# Patient Record
Sex: MALE | Race: OTHER | URBAN - METROPOLITAN AREA
[De-identification: names, ages, dates, MRNs, and addresses within clinical notes are randomized per-mention and may not be internally consistent; named-entity substitution may affect disease eponyms.]

---

## 2017-01-26 VITALS
HEART RATE: 60 BPM | TEMPERATURE: 97 F | SYSTOLIC BLOOD PRESSURE: 144 MMHG | DIASTOLIC BLOOD PRESSURE: 70 MMHG | OXYGEN SATURATION: 98 % | RESPIRATION RATE: 14 BRPM

## 2017-01-26 RX ORDER — CHLORHEXIDINE GLUCONATE 213 G/1000ML
1 SOLUTION TOPICAL ONCE
Qty: 0 | Refills: 0 | Status: DISCONTINUED | OUTPATIENT
Start: 2017-01-27 | End: 2017-01-28

## 2017-01-26 NOTE — H&P ADULT. - HISTORY OF PRESENT ILLNESS
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 Of note pt had 14 second run of NSVT on last admission and had EP consult, Holter monitor ordered, and EP follow up appointment.   Of note pt was reluctant to listen to the discharge and follow up instructions and was given paper prescriptions from the last admission 2/2 to reluctance to give an appropriate pharmacy.   Cardiac Cath @ [facility] on 2016: YAO to the prox/mid RCA, patent SVG OM1, occluded SVG OM@ and SVG RCA, EF 45%. Patient will need to return for staged PCI of LAD in 5 weeks (scheduled for 8/12/16). 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 Of note pt had 14 second run of NSVT on last admission and had EP consult, Holter monitor ordered, and EP follow up appointment.   Of note pt was reluctant to listen to the discharge and follow up instructions and was given paper prescriptions from the last admission 2/2 to reluctance to give an appropriate pharmacy.   Cardiac Cath @ [facility] on 2016: YAO x 3 to the prox/mid/distal RCA, patent KEVIN-LAD, prox SVG-OM1 50%, occluded  SVG OM2 and SVG RPDA. EF 45%. dLM 30%. mLAD 705. dLAD 90%. mCx 100% . Patient will need to return for staged PCI of LAD in 5 weeks (scheduled for 8/12/16). 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 Of note pt had 14 second run of NSVT on 7/2016 admission and had EP consult, Holter monitor ordered, and EP follow up appointment. Unclear if pt followed up. Pt has been endorsing consistent chest pain at rest since the last cardiac catheterization, no relieved with Tylenol. Pt was told to go to the nearest ED if he is having pain. Pt states he's been to his PMD multiple times endorsing the chest pain and his MD gives him medications (but not Nitro SL) that have not helped with the pain. Pt was told by JO ANN Hammond to go to the nearest ED if he is having chest pain at rest that does not resolve.  Message left to inform Dr. Grimm.   Of note pt was reluctant to listen to the discharge and follow up instructions and was given paper prescriptions from the last admission 2/2 to reluctance to give an appropriate pharmacy.   Cardiac Cath @ [facility] on 2016: YAO x 3 to the prox/mid/distal RCA, patent KEVIN-LAD, prox SVG-OM1 50%, occluded  SVG OM2 and SVG RPDA. EF 45%. dLM 30%. mLAD 705. dLAD 90%. mCx 100% . Patient will need to return for staged PCI of LAD in 5 weeks (scheduled for 8/12/16). 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 Of note pt had 14 second run of NSVT on 7/2016 admission and had EP consult, Holter monitor ordered, and EP follow up appointment. Unclear if pt followed up. Pt has been endorsing consistent chest pain at rest since the last cardiac catheterization, no relieved with Tylenol. Pt was told to go to the nearest ED if he is having pain. Pt states he's been to his PMD multiple times endorsing the chest pain and his MD gives him medications (but not Nitro SL) that have not helped with the pain. Pt was told by JO ANN Hammond to go to the nearest ED if he is having chest pain at rest that does not resolve.  Message left to inform Dr. Grimm.   Of note pt was reluctant to listen to the discharge and follow up instructions and was given paper prescriptions from the last admission 2/2 to reluctance to give an appropriate pharmacy.   Cardiac Cath @ [facility] on 2016: YAO x 3 to the prox/mid/distal RCA, patent LIMA-D1, prox SVG-OM1 50%, occluded  SVG OM2 and SVG RPDA. EF 45%. dLM 30%. mLAD 705. dLAD 90%. mCx 100% . Patient will need to return for staged PCI of LAD in 5 weeks (scheduled for 8/12/16). 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 Of note pt had 14 second run of NSVT on 7/2016 admission and had EP consult, Holter monitor ordered, and EP follow up appointment. Unclear if pt followed up. Pt has been endorsing consistent chest pain at rest since the last cardiac catheterization, no relieved with Tylenol. Pt was told to go to the nearest ED if he is having pain. Pt states he's been to his PMD multiple times endorsing the chest pain and his MD gives him medications (but not Nitro SL) that have not helped with the pain. Pt was told by JO ANN Hammond to go to the nearest ED if he is having chest pain at rest that does not resolve.  Message left to inform Dr. Grimm.   Of note pt was reluctant to listen to the discharge and follow up instructions and was given paper prescriptions from the last admission 2/2 to reluctance to give an appropriate pharmacy.   Cardiac Cath @ [facility] on 7/11/2016: YAO x 3 to the prox/mid/distal RCA, patent LIMA-D1, prox SVG-OM1 50%, occluded  SVG OM2 and SVG RPDA. EF 45%. dLM 30%. mLAD 705. dLAD 90%. mCx 100% . Patient will need to return for staged PCI of LAD in 5 weeks (scheduled for 8/12/16). 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 Of note pt had 14 second run of NSVT on 7/2016 admission and had EP consult, Holter monitor ordered, and EP follow up appointment. Unclear if pt followed up. Pt has been endorsing consistent chest pain at rest since the last cardiac catheterization, no relieved with Tylenol. Pt was told to go to the nearest ED if he is having pain. Pt states he's been to his PMD multiple times endorsing the chest pain and his MD gives him medications (but not Nitro SL) that have not helped with the pain. Pt was told by JO ANN Hammond to go to the nearest ED if he is having chest pain at rest that does not resolve.  Message left to inform Dr. Grimm.   Of note pt was reluctant to listen to the discharge and follow up instructions and was given paper prescriptions from the last admission 2/2 to reluctance to give an appropriate pharmacy.   Cardiac Cath @ [facility] on 7/11/2016: YAO x 3 to the prox/mid/distal RCA, patent LIMA-D1, prox SVG-OM1 50%, occluded  SVG OM2 and SVG RPDA. EF 45%. dLM 30%. mLAD 705. dLAD 90%. mCx 100% . Patient will need to return for staged PCI of LAD in 5 weeks (scheduled for 8/12/16).

## 2017-01-26 NOTE — H&P ADULT. - PMH
Hypercholesterolemia    Hypertension    Pneumonia CAD (coronary artery disease)    Hypercholesterolemia    Hypertension    Pneumonia

## 2017-01-26 NOTE — H&P ADULT. - ASSESSMENT
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     ASA III           Mallampati III  Risks & benefits of procedure and alternative therapy have been explained to the patient including but not limited to: allergic reaction, bleeding w/possible need for blood transfusion, infection, renal and vascular compromise, limb damage, arrhythmia, stroke, vessel dissection/perforation, Myocardial infarction, emergent CABG. Informed consent obtained and in chart.     Upon arrival to the cath lab patient reported chest pain. Upon arrival of PA patient reports chest pain has improved since arrival now 3/10. Patient reports associated SOB and palpitations but denies dizziness, diaphoresis, N/V, syncope. VSS on arrival EKG performed revealing no acute ST changes however TWI III, V4-V6. Patient appears comfortable.    On prior admission 7/2016 patient was reluctant to provide an appropriate pharmacy and was reluctant to listen to discharge and follow-up instructions and given paper prescriptions. Both [facility] and [facility] contacted and last prescription filled for Plavix was 9/2016. Patient brought medication bottles and Plavix not with medication bottles. CBC stable. ASA  mg PO x 1 and Plavix 600 mg PO x 1 ordered. 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  In light of patient's risk factors, known CAD, CCS Angina Class IV Symptoms patient now presents for recommended cardiac cath with probable PCI of LAD and to rule out suspected ISR of RCA.     ASA III           Mallampati III  Risks & benefits of procedure and alternative therapy have been explained to the patient including but not limited to: allergic reaction, bleeding w/possible need for blood transfusion, infection, renal and vascular compromise, limb damage, arrhythmia, stroke, vessel dissection/perforation, Myocardial infarction, emergent CABG. Informed consent obtained and in chart.     Upon arrival to the cath lab patient reported chest pain. Upon arrival of PA patient reports chest pain has improved since arrival now 3/10. Patient reports associated SOB and palpitations but denies dizziness, diaphoresis, N/V, syncope. VSS on arrival EKG performed revealing no acute ST changes however TWI III, V4-V6. Patient appears comfortable. PA unable to obtain prior EKG for comparison due to [place] Error    On prior admission 7/2016 patient was reluctant to provide an appropriate pharmacy and was reluctant to listen to discharge and follow-up instructions and given paper prescriptions. Both [facility] and [facility] contacted and last prescription filled for Plavix was 9/2016. Patient brought medication bottles and Plavix not with medication bottles. CBC stable. ASA  mg PO x 1 and Plavix 600 mg PO x 1 ordered and given prior to procedure. Prescription for Imdur ER 30 mg PO daily filled 1/16/17 however not included with medication bottles brought in by patient and patient is unsure if he is taking. Patient prefers prescriptions to be sent to [facility]. 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  In light of patient's risk factors, known CAD, CCS Angina Class IV Symptoms patient now presents for recommended cardiac cath with probable PCI of LAD and to rule out suspected ISR of RCA.     ASA III           Mallampati III  Risks & benefits of procedure and alternative therapy have been explained to the patient including but not limited to: allergic reaction, bleeding w/possible need for blood transfusion, infection, renal and vascular compromise, limb damage, arrhythmia, stroke, vessel dissection/perforation, Myocardial infarction, emergent CABG. Informed consent obtained and in chart.     Upon arrival to the cath lab patient reported chest pain. Upon arrival of PA patient reports chest pain has improved since arrival now 3/10. Patient reports associated SOB and palpitations but denies dizziness, diaphoresis, N/V, syncope. VSS on arrival EKG performed revealing no acute ST changes however TWI III, V4-V6. Patient appears comfortable. PA unable to obtain prior EKG for comparison due to [place] Error    On prior admission 7/2016 patient was reluctant to provide an appropriate pharmacy and was reluctant to listen to discharge and follow-up instructions and given paper prescriptions. Both [facility] and [facility] contacted and last prescription filled for Plavix was 9/2016. Patient brought medication bottles and Plavix not with medication bottles. CBC stable. ASA  mg PO x 1 and Plavix 600 mg PO x 1 ordered and given prior to procedure. Prescription for Imdur ER 30 mg PO daily filled 1/16/17 however not included with medication bottles brought in by patient and patient is unsure if he is taking. Patient prefers prescriptions to be sent to [facility]. Prescription for Plavix #30 with 11 refills E-Prescribed to [facility] by JO ANN Morales and patient instructed to .

## 2017-01-27 ENCOUNTER — INPATIENT (INPATIENT)
Facility: HOSPITAL | Age: 72
LOS: 0 days | Discharge: HOME | DRG: 246 | End: 2017-01-28
Attending: INTERNAL MEDICINE | Admitting: INTERNAL MEDICINE
Payer: MEDICARE

## 2017-01-27 DIAGNOSIS — Z95.1 PRESENCE OF AORTOCORONARY BYPASS GRAFT: Chronic | ICD-10-CM

## 2017-01-27 LAB
ALBUMIN SERPL ELPH-MCNC: 3.8 G/DL — SIGNIFICANT CHANGE UP (ref 3.4–5)
ALP SERPL-CCNC: 95 U/L — SIGNIFICANT CHANGE UP (ref 40–120)
ALT FLD-CCNC: 41 U/L — SIGNIFICANT CHANGE UP (ref 12–42)
ANION GAP SERPL CALC-SCNC: 7 MMOL/L — LOW (ref 9–16)
APTT BLD: 31.5 SEC — SIGNIFICANT CHANGE UP (ref 27.5–37.4)
AST SERPL-CCNC: 28 U/L — SIGNIFICANT CHANGE UP (ref 15–37)
BASOPHILS NFR BLD AUTO: 0.2 % — SIGNIFICANT CHANGE UP (ref 0–2)
BILIRUB SERPL-MCNC: 0.4 MG/DL — SIGNIFICANT CHANGE UP (ref 0.2–1.2)
BUN SERPL-MCNC: 20 MG/DL — SIGNIFICANT CHANGE UP (ref 7–23)
CALCIUM SERPL-MCNC: 8.5 MG/DL — SIGNIFICANT CHANGE UP (ref 8.5–10.5)
CHLORIDE SERPL-SCNC: 110 MMOL/L — HIGH (ref 96–108)
CHOLEST SERPL-MCNC: 126 MG/DL — SIGNIFICANT CHANGE UP
CK MB CFR SERPL CALC: 2.4 NG/ML — SIGNIFICANT CHANGE UP (ref 0.5–3.6)
CO2 SERPL-SCNC: 27 MMOL/L — SIGNIFICANT CHANGE UP (ref 22–31)
CREAT SERPL-MCNC: 0.84 MG/DL — SIGNIFICANT CHANGE UP (ref 0.5–1.3)
EOSINOPHIL NFR BLD AUTO: 2.3 % — SIGNIFICANT CHANGE UP (ref 0–6)
GLUCOSE SERPL-MCNC: 91 MG/DL — SIGNIFICANT CHANGE UP (ref 70–99)
HBA1C BLD-MCNC: 6 % — HIGH (ref 4.8–5.6)
HCT VFR BLD CALC: 40.6 % — SIGNIFICANT CHANGE UP (ref 39–50)
HDLC SERPL-MCNC: 43 MG/DL — SIGNIFICANT CHANGE UP
HGB BLD-MCNC: 13.8 G/DL — SIGNIFICANT CHANGE UP (ref 13–17)
INR BLD: 1.14 — SIGNIFICANT CHANGE UP (ref 0.88–1.16)
LIPID PNL WITH DIRECT LDL SERPL: 62 MG/DL — SIGNIFICANT CHANGE UP
LYMPHOCYTES # BLD AUTO: 27.5 % — SIGNIFICANT CHANGE UP (ref 13–44)
MCHC RBC-ENTMCNC: 30.7 PG — SIGNIFICANT CHANGE UP (ref 27–34)
MCHC RBC-ENTMCNC: 34 G/DL — SIGNIFICANT CHANGE UP (ref 32–36)
MCV RBC AUTO: 90.4 FL — SIGNIFICANT CHANGE UP (ref 80–100)
MONOCYTES NFR BLD AUTO: 7.2 % — SIGNIFICANT CHANGE UP (ref 2–14)
NEUTROPHILS NFR BLD AUTO: 62.8 % — SIGNIFICANT CHANGE UP (ref 43–77)
PLATELET # BLD AUTO: 145 K/UL — LOW (ref 150–400)
POTASSIUM SERPL-MCNC: 4 MMOL/L — SIGNIFICANT CHANGE UP (ref 3.5–5.3)
POTASSIUM SERPL-SCNC: 4 MMOL/L — SIGNIFICANT CHANGE UP (ref 3.5–5.3)
PROT SERPL-MCNC: 7.7 G/DL — SIGNIFICANT CHANGE UP (ref 6.4–8.2)
PROTHROM AB SERPL-ACNC: 12.7 SEC — SIGNIFICANT CHANGE UP (ref 10–13.1)
RBC # BLD: 4.49 M/UL — SIGNIFICANT CHANGE UP (ref 4.2–5.8)
RBC # FLD: 12.9 % — SIGNIFICANT CHANGE UP (ref 10.3–16.9)
SODIUM SERPL-SCNC: 144 MMOL/L — SIGNIFICANT CHANGE UP (ref 135–145)
TOTAL CHOLESTEROL/HDL RATIO MEASUREMENT: 2.9 RATIO — SIGNIFICANT CHANGE UP
TRIGL SERPL-MCNC: 104 MG/DL — SIGNIFICANT CHANGE UP
WBC # BLD: 6.1 K/UL — SIGNIFICANT CHANGE UP (ref 3.8–10.5)
WBC # FLD AUTO: 6.1 K/UL — SIGNIFICANT CHANGE UP (ref 3.8–10.5)

## 2017-01-27 PROCEDURE — 93455 CORONARY ART/GRFT ANGIO S&I: CPT | Mod: 26,XU

## 2017-01-27 PROCEDURE — 92933 PRQ TRLML C ATHRC ST ANGIOP1: CPT | Mod: LD

## 2017-01-27 PROCEDURE — 93010 ELECTROCARDIOGRAM REPORT: CPT | Mod: 59

## 2017-01-27 PROCEDURE — 92937 PRQ TRLUML REVSC CAB GRF 1: CPT | Mod: LC

## 2017-01-27 RX ORDER — LEVOTHYROXINE SODIUM 125 MCG
50 TABLET ORAL DAILY
Qty: 0 | Refills: 0 | Status: DISCONTINUED | OUTPATIENT
Start: 2017-01-27 | End: 2017-01-28

## 2017-01-27 RX ORDER — METOPROLOL TARTRATE 50 MG
1 TABLET ORAL
Qty: 30 | Refills: 3 | COMMUNITY

## 2017-01-27 RX ORDER — ASPIRIN/CALCIUM CARB/MAGNESIUM 324 MG
81 TABLET ORAL DAILY
Qty: 0 | Refills: 0 | Status: DISCONTINUED | OUTPATIENT
Start: 2017-01-27 | End: 2017-01-28

## 2017-01-27 RX ORDER — CLOPIDOGREL BISULFATE 75 MG/1
600 TABLET, FILM COATED ORAL ONCE
Qty: 0 | Refills: 0 | Status: COMPLETED | OUTPATIENT
Start: 2017-01-27 | End: 2017-01-27

## 2017-01-27 RX ORDER — TAMSULOSIN HYDROCHLORIDE 0.4 MG/1
0.4 CAPSULE ORAL AT BEDTIME
Qty: 0 | Refills: 0 | Status: DISCONTINUED | OUTPATIENT
Start: 2017-01-27 | End: 2017-01-28

## 2017-01-27 RX ORDER — SODIUM CHLORIDE 9 MG/ML
1000 INJECTION INTRAMUSCULAR; INTRAVENOUS; SUBCUTANEOUS
Qty: 0 | Refills: 0 | Status: DISCONTINUED | OUTPATIENT
Start: 2017-01-27 | End: 2017-01-28

## 2017-01-27 RX ORDER — TAMSULOSIN HYDROCHLORIDE 0.4 MG/1
1 CAPSULE ORAL
Qty: 0 | Refills: 0 | COMMUNITY

## 2017-01-27 RX ORDER — CLOPIDOGREL BISULFATE 75 MG/1
1 TABLET, FILM COATED ORAL
Qty: 30 | Refills: 11 | OUTPATIENT
Start: 2017-01-27 | End: 2018-01-21

## 2017-01-27 RX ORDER — ISOSORBIDE MONONITRATE 60 MG/1
1 TABLET, EXTENDED RELEASE ORAL
Qty: 0 | Refills: 0 | COMMUNITY

## 2017-01-27 RX ORDER — SODIUM CHLORIDE 9 MG/ML
500 INJECTION INTRAMUSCULAR; INTRAVENOUS; SUBCUTANEOUS
Qty: 0 | Refills: 0 | Status: DISCONTINUED | OUTPATIENT
Start: 2017-01-27 | End: 2017-01-27

## 2017-01-27 RX ORDER — METOPROLOL TARTRATE 50 MG
25 TABLET ORAL DAILY
Qty: 0 | Refills: 0 | Status: DISCONTINUED | OUTPATIENT
Start: 2017-01-27 | End: 2017-01-28

## 2017-01-27 RX ORDER — ATORVASTATIN CALCIUM 80 MG/1
40 TABLET, FILM COATED ORAL AT BEDTIME
Qty: 0 | Refills: 0 | Status: DISCONTINUED | OUTPATIENT
Start: 2017-01-27 | End: 2017-01-28

## 2017-01-27 RX ORDER — ASPIRIN/CALCIUM CARB/MAGNESIUM 324 MG
325 TABLET ORAL ONCE
Qty: 0 | Refills: 0 | Status: COMPLETED | OUTPATIENT
Start: 2017-01-27 | End: 2017-01-27

## 2017-01-27 RX ORDER — CLOPIDOGREL BISULFATE 75 MG/1
75 TABLET, FILM COATED ORAL DAILY
Qty: 0 | Refills: 0 | Status: DISCONTINUED | OUTPATIENT
Start: 2017-01-27 | End: 2017-01-28

## 2017-01-27 RX ORDER — LOSARTAN POTASSIUM 100 MG/1
25 TABLET, FILM COATED ORAL DAILY
Qty: 0 | Refills: 0 | Status: DISCONTINUED | OUTPATIENT
Start: 2017-01-27 | End: 2017-01-28

## 2017-01-27 RX ADMIN — SODIUM CHLORIDE 75 MILLILITER(S): 9 INJECTION INTRAMUSCULAR; INTRAVENOUS; SUBCUTANEOUS at 11:42

## 2017-01-27 RX ADMIN — SODIUM CHLORIDE 100 MILLILITER(S): 9 INJECTION INTRAMUSCULAR; INTRAVENOUS; SUBCUTANEOUS at 17:50

## 2017-01-27 RX ADMIN — CLOPIDOGREL BISULFATE 600 MILLIGRAM(S): 75 TABLET, FILM COATED ORAL at 11:41

## 2017-01-27 RX ADMIN — TAMSULOSIN HYDROCHLORIDE 0.4 MILLIGRAM(S): 0.4 CAPSULE ORAL at 22:22

## 2017-01-27 RX ADMIN — Medication 325 MILLIGRAM(S): at 11:41

## 2017-01-27 RX ADMIN — ATORVASTATIN CALCIUM 40 MILLIGRAM(S): 80 TABLET, FILM COATED ORAL at 22:22

## 2017-01-28 ENCOUNTER — TRANSCRIPTION ENCOUNTER (OUTPATIENT)
Age: 72
End: 2017-01-28

## 2017-01-28 VITALS — TEMPERATURE: 98 F

## 2017-01-28 LAB
ANION GAP SERPL CALC-SCNC: 6 MMOL/L — LOW (ref 9–16)
BUN SERPL-MCNC: 18 MG/DL — SIGNIFICANT CHANGE UP (ref 7–23)
CALCIUM SERPL-MCNC: 8.6 MG/DL — SIGNIFICANT CHANGE UP (ref 8.5–10.5)
CHLORIDE SERPL-SCNC: 108 MMOL/L — SIGNIFICANT CHANGE UP (ref 96–108)
CO2 SERPL-SCNC: 27 MMOL/L — SIGNIFICANT CHANGE UP (ref 22–31)
CREAT SERPL-MCNC: 0.8 MG/DL — SIGNIFICANT CHANGE UP (ref 0.5–1.3)
GLUCOSE SERPL-MCNC: 94 MG/DL — SIGNIFICANT CHANGE UP (ref 70–99)
HCT VFR BLD CALC: 39.5 % — SIGNIFICANT CHANGE UP (ref 39–50)
HGB BLD-MCNC: 13.2 G/DL — SIGNIFICANT CHANGE UP (ref 13–17)
MAGNESIUM SERPL-MCNC: 2 MG/DL — SIGNIFICANT CHANGE UP (ref 1.6–2.4)
MCHC RBC-ENTMCNC: 29.6 PG — SIGNIFICANT CHANGE UP (ref 27–34)
MCHC RBC-ENTMCNC: 33.4 G/DL — SIGNIFICANT CHANGE UP (ref 32–36)
MCV RBC AUTO: 88.6 FL — SIGNIFICANT CHANGE UP (ref 80–100)
PLATELET # BLD AUTO: 133 K/UL — LOW (ref 150–400)
POTASSIUM SERPL-MCNC: 4.1 MMOL/L — SIGNIFICANT CHANGE UP (ref 3.5–5.3)
POTASSIUM SERPL-SCNC: 4.1 MMOL/L — SIGNIFICANT CHANGE UP (ref 3.5–5.3)
RBC # BLD: 4.46 M/UL — SIGNIFICANT CHANGE UP (ref 4.2–5.8)
RBC # FLD: 12.4 % — SIGNIFICANT CHANGE UP (ref 10.3–16.9)
SODIUM SERPL-SCNC: 141 MMOL/L — SIGNIFICANT CHANGE UP (ref 135–145)
WBC # BLD: 6.5 K/UL — SIGNIFICANT CHANGE UP (ref 3.8–10.5)
WBC # FLD AUTO: 6.5 K/UL — SIGNIFICANT CHANGE UP (ref 3.8–10.5)

## 2017-01-28 RX ADMIN — Medication 50 MICROGRAM(S): at 06:23

## 2017-01-28 RX ADMIN — LOSARTAN POTASSIUM 25 MILLIGRAM(S): 100 TABLET, FILM COATED ORAL at 06:23

## 2017-01-28 RX ADMIN — Medication 25 MILLIGRAM(S): at 06:23

## 2017-01-28 NOTE — DISCHARGE NOTE ADULT - HOSPITAL COURSE
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  In light of patient's risk factors, known CAD, CCS Angina Class IV Symptoms patient now presents for recommended cardiac cath with probable PCI of LAD and to rule out suspected ISR of RCA.Upon arrival to the cath lab patient reported chest pain. Upon arrival of PA patient reports chest pain has improved since arrival now 3/10. Patient reports associated SOB and palpitations but denies dizziness, diaphoresis, N/V, syncope. VSS on arrival EKG performed revealing no acute ST changes however TWI III, V4-V6. Patient appears comfortable. PA unable to obtain prior EKG for comparison due to [place] Error. On prior admission 7/2016 patient was reluctant to provide an appropriate pharmacy and was reluctant to listen to discharge and follow-up instructions and given paper prescriptions. Both [facility] and [facility] contacted and last prescription filled for Plavix was 9/2016. Patient brought medication bottles and Plavix not with medication bottles. CBC stable. ASA  mg PO x 1 and Plavix 600 mg PO x 1 ordered and given prior to procedure. Prescription for Imdur ER 30 mg PO daily filled 1/16/17 however not included with medication bottles brought in by patient and patient is unsure if he is taking. Patient prefers prescriptions to be sent to [facility]. Prescription for Plavix #30 with 11 refills E-Prescribed to [facility] by JO ANN Morales and patient instructed to .  Pt underwent Cardiac Cath on 1/27/17 revealing 2VD with patent RCA stent, YAO x 2 to SVG/OM2 graft, PTCA with CSI Atherectomy and YAO x 2 pLAD, PTCA/YAO x 1 to mLAD, PTCA only to dLAD. 250cc dye used. Hydration increased to 100cc/hr no EF done. Plavix was sent to patient's pharmacy day of cath.  Pt has all other medication and was instructed at length importance of medication compliance by PA an nurse at bedside.  EKG and labs reviewed. R groin ecchymotic, but soft non-tender no bleeding and reports no complaints o/n.  Pt stable for d/c as d/w Dr. Briones with f/u in Dr. Delgado's office in 1-2 weeks. D/C ppw signed and in chart.

## 2017-01-28 NOTE — DISCHARGE NOTE ADULT - PATIENT PORTAL LINK FT
“You can access the FollowHealth Patient Portal, offered by [facility], by registering with the following website: [email or web address]”

## 2017-01-28 NOTE — DISCHARGE NOTE ADULT - CARE PLAN
Principal Discharge DX:	CAD (coronary artery disease)  Goal:	You had blockages that you were supposed to come back for last year and instead came in yesterday and you got a total of 5 new stents.  YOU NEED TO TAKE ASPIRIN 81MG AND PLAVIX 75MG DAILY AND DO NOT SKIP DOSES OR STOP THESE MEDICATIONS UNLESS INSTRUCTED BY YOUR CARDIOLOGIST DR. TALAMANTES IN 1-2 WEEKS.  Instructions for follow-up, activity and diet:	You underwent a coronary angiogram and should wait 3 days before returning to ordinary activities. Do not drive for 2 days. Consult your doctor before returning to vigorous activity. You may return to work in 3-5 days. The catheter from your groin was removed and dressing was removed. You may see some bruising but it should remain soft and not hard or bleeding. You may shower once the dressing is removed, but avoid baths, hot tubs, or swimming for 5 days to prevent infection. If you notice bleeding from the site, hardening and pain at the site, drainage or redness from the site, coolness/paleness of the extremity, swelling, or fever, please call [phone]. Please continue your aspirin and plavix as prescribed unless otherwise indicated by your cardiologist. All of your prescriptions have been sent to the pharmacy. Please follow up with  in 1-2 weeks. All of your needed prescriptions have been sent electronically to your pharmacy.  Secondary Diagnosis:	Hypertension  Instructions for follow-up, activity and diet:	You have a history of elevated blood pressure and you should continue your blood pressure medications of Losartan 25mg daily, Metoprolol Succinate ER 25mg daily.  Secondary Diagnosis:	Hypercholesterolemia  Instructions for follow-up, activity and diet:	Please continue your daily Atorvastatin 40mg at bedtime to ensure your cardiac stent remains open.  Secondary Diagnosis:	BPH (benign prostatic hypertrophy)  Instructions for follow-up, activity and diet:	Continue taking your Flomax 0.4mg at bedtime  Secondary Diagnosis:	Hypothyroidism  Instructions for follow-up, activity and diet:	Continue taking your Synthroid 50mcg daily. Principal Discharge DX:	CAD (coronary artery disease)  Goal:	You had blockages that you were supposed to come back for last year and instead came in yesterday and you got a total of 5 new stents.  YOU NEED TO TAKE ASPIRIN 81MG AND PLAVIX 75MG DAILY AND DO NOT SKIP DOSES OR STOP THESE MEDICATIONS UNLESS INSTRUCTED BY YOUR CARDIOLOGIST DR. TALAMANTES IN 1-2 WEEKS.  Instructions for follow-up, activity and diet:	You underwent a coronary angiogram and should wait 3 days before returning to ordinary activities. Do not drive for 2 days. Consult your doctor before returning to vigorous activity. You may return to work in 3-5 days. The catheter from your groin was removed and dressing was removed. You may see some bruising but it should remain soft and not hard or bleeding. You may shower once the dressing is removed, but avoid baths, hot tubs, or swimming for 5 days to prevent infection. If you notice bleeding from the site, hardening and pain at the site, drainage or redness from the site, coolness/paleness of the extremity, swelling, or fever, please call [phone]. Please continue your aspirin and plavix as prescribed unless otherwise indicated by your cardiologist. All of your prescriptions have been sent to the pharmacy. Please follow up with  in 1-2 weeks. All of your needed prescriptions have been sent electronically to your pharmacy.  Secondary Diagnosis:	Hypertension  Instructions for follow-up, activity and diet:	You have a history of elevated blood pressure and you should continue your blood pressure medications of Losartan 25mg daily, Metoprolol Succinate ER 25mg daily.  Secondary Diagnosis:	Hypercholesterolemia  Instructions for follow-up, activity and diet:	Please continue your daily Atorvastatin 40mg at bedtime to ensure your cardiac stent remains open.  Secondary Diagnosis:	BPH (benign prostatic hypertrophy)  Instructions for follow-up, activity and diet:	Continue taking your Flomax 0.4mg at bedtime  Secondary Diagnosis:	Hypothyroidism  Instructions for follow-up, activity and diet:	Continue taking your Synthroid 50mcg daily.

## 2017-01-28 NOTE — DISCHARGE NOTE ADULT - CARE PROVIDER_API CALL
Markell Delgado), Cardiovascular Disease  [address] [place] [address]  Phone: [phone]  Fax: [phone]

## 2017-01-28 NOTE — DISCHARGE NOTE ADULT - MEDICATION SUMMARY - MEDICATIONS TO TAKE
I will START or STAY ON the medications listed below when I get home from the hospital:    Aspirin Enteric Coated 81 mg oral delayed release tablet  -- 1 tab(s) by mouth once a day  -- Indication: For CAD (coronary artery disease)    losartan 25 mg oral tablet  -- 1 tab(s) by mouth once a day  -- Indication: For Hypertension    Flomax 0.4 mg oral capsule  -- 1 cap(s) by mouth once a day  -- Indication: For BPH    Lipitor 40 mg oral tablet  -- 1 tab(s) by mouth once a day (at bedtime)  -- Indication: For Hyperlipidemia    clopidogrel 75 mg oral tablet  -- 1 tab(s) by mouth once a day  -- Do not take aspirin or aspirin containing products without knowledge and consent of your physician.    -- Indication: For CAD (coronary artery disease)    metoprolol succinate 25 mg oral tablet, extended release  -- 1 tab(s) by mouth once a day  -- Indication: For Hypertension    levothyroxine 50 mcg (0.05 mg) oral capsule  -- 1 cap(s) by mouth once a day  -- Indication: For Hypothyroidism

## 2017-01-28 NOTE — DISCHARGE NOTE ADULT - CARE PROVIDERS DIRECT ADDRESSES
,DirectAddress_Unknown,[email or web address]

## 2017-01-28 NOTE — DISCHARGE NOTE ADULT - PLAN OF CARE
You had blockages that you were supposed to come back for last year and instead came in yesterday and you got a total of 5 new stents.  YOU NEED TO TAKE ASPIRIN 81MG AND PLAVIX 75MG DAILY AND DO NOT SKIP DOSES OR STOP THESE MEDICATIONS UNLESS INSTRUCTED BY YOUR CARDIOLOGIST DR. TALAMANTES IN 1-2 WEEKS. You underwent a coronary angiogram and should wait 3 days before returning to ordinary activities. Do not drive for 2 days. Consult your doctor before returning to vigorous activity. You may return to work in 3-5 days. The catheter from your groin was removed and dressing was removed. You may see some bruising but it should remain soft and not hard or bleeding. You may shower once the dressing is removed, but avoid baths, hot tubs, or swimming for 5 days to prevent infection. If you notice bleeding from the site, hardening and pain at the site, drainage or redness from the site, coolness/paleness of the extremity, swelling, or fever, please call [phone]. Please continue your aspirin and plavix as prescribed unless otherwise indicated by your cardiologist. All of your prescriptions have been sent to the pharmacy. Please follow up with  in 1-2 weeks. All of your needed prescriptions have been sent electronically to your pharmacy. You have a history of elevated blood pressure and you should continue your blood pressure medications of Losartan 25mg daily, Metoprolol Succinate ER 25mg daily. Please continue your daily Atorvastatin 40mg at bedtime to ensure your cardiac stent remains open. Continue taking your Flomax 0.4mg at bedtime Continue taking your Synthroid 50mcg daily.

## 2017-01-30 PROCEDURE — 85025 COMPLETE CBC W/AUTO DIFF WBC: CPT

## 2017-01-30 PROCEDURE — C1874: CPT

## 2017-01-30 PROCEDURE — 80048 BASIC METABOLIC PNL TOTAL CA: CPT

## 2017-01-30 PROCEDURE — 82553 CREATINE MB FRACTION: CPT

## 2017-01-30 PROCEDURE — 36415 COLL VENOUS BLD VENIPUNCTURE: CPT

## 2017-01-30 PROCEDURE — C1769: CPT

## 2017-01-30 PROCEDURE — C1760: CPT

## 2017-01-30 PROCEDURE — 83036 HEMOGLOBIN GLYCOSYLATED A1C: CPT

## 2017-01-30 PROCEDURE — 82550 ASSAY OF CK (CPK): CPT

## 2017-01-30 PROCEDURE — 93005 ELECTROCARDIOGRAM TRACING: CPT

## 2017-01-30 PROCEDURE — 83735 ASSAY OF MAGNESIUM: CPT

## 2017-01-30 PROCEDURE — 85730 THROMBOPLASTIN TIME PARTIAL: CPT

## 2017-01-30 PROCEDURE — 85027 COMPLETE CBC AUTOMATED: CPT

## 2017-01-30 PROCEDURE — C1887: CPT

## 2017-01-30 PROCEDURE — C1894: CPT

## 2017-01-30 PROCEDURE — 80053 COMPREHEN METABOLIC PANEL: CPT

## 2017-01-30 PROCEDURE — C1724: CPT

## 2017-01-30 PROCEDURE — 85610 PROTHROMBIN TIME: CPT

## 2017-01-30 PROCEDURE — C1725: CPT

## 2017-01-30 PROCEDURE — 80061 LIPID PANEL: CPT

## 2017-01-31 DIAGNOSIS — E03.9 HYPOTHYROIDISM, UNSPECIFIED: ICD-10-CM

## 2017-01-31 DIAGNOSIS — Z86.73 PERSONAL HISTORY OF TRANSIENT ISCHEMIC ATTACK (TIA), AND CEREBRAL INFARCTION WITHOUT RESIDUAL DEFICITS: ICD-10-CM

## 2017-01-31 DIAGNOSIS — Z87.891 PERSONAL HISTORY OF NICOTINE DEPENDENCE: ICD-10-CM

## 2017-01-31 DIAGNOSIS — R06.00 DYSPNEA, UNSPECIFIED: ICD-10-CM

## 2017-01-31 DIAGNOSIS — Z91.19 PATIENT'S NONCOMPLIANCE WITH OTHER MEDICAL TREATMENT AND REGIMEN: ICD-10-CM

## 2017-01-31 DIAGNOSIS — I25.119 ATHEROSCLEROTIC HEART DISEASE OF NATIVE CORONARY ARTERY WITH UNSPECIFIED ANGINA PECTORIS: ICD-10-CM

## 2017-01-31 DIAGNOSIS — N40.0 BENIGN PROSTATIC HYPERPLASIA WITHOUT LOWER URINARY TRACT SYMPTOMS: ICD-10-CM

## 2017-01-31 DIAGNOSIS — E78.5 HYPERLIPIDEMIA, UNSPECIFIED: ICD-10-CM

## 2017-01-31 DIAGNOSIS — Z95.1 PRESENCE OF AORTOCORONARY BYPASS GRAFT: ICD-10-CM

## 2017-01-31 DIAGNOSIS — I25.2 OLD MYOCARDIAL INFARCTION: ICD-10-CM

## 2017-04-18 ENCOUNTER — HOSPITAL ENCOUNTER (EMERGENCY)
Dept: HOSPITAL 74 - JER | Age: 72
Discharge: HOME | End: 2017-04-18
Payer: MEDICARE

## 2017-04-18 VITALS — TEMPERATURE: 97.9 F | HEART RATE: 88 BPM | SYSTOLIC BLOOD PRESSURE: 137 MMHG | DIASTOLIC BLOOD PRESSURE: 84 MMHG

## 2017-04-18 VITALS — BODY MASS INDEX: 28.3 KG/M2

## 2017-04-18 DIAGNOSIS — K21.9: ICD-10-CM

## 2017-04-18 DIAGNOSIS — Z87.891: ICD-10-CM

## 2017-04-18 DIAGNOSIS — R04.0: Primary | ICD-10-CM

## 2017-04-18 DIAGNOSIS — Z95.1: ICD-10-CM

## 2017-04-18 DIAGNOSIS — I10: ICD-10-CM

## 2017-04-18 DIAGNOSIS — E78.00: ICD-10-CM

## 2017-04-18 DIAGNOSIS — K76.9: ICD-10-CM

## 2017-04-18 NOTE — PDOC
History of Present Illness





- General


History Source: Patient


Exam Limitations: No Limitations





- History of Present Illness


Initial Comments: 





04/18/17 10:46


The patient is a 71 year old male, with a significant past medical history of 

CAD s/p CABG, HTN, HLD, gastric reflux and nose bleeds, who presents to the 

emergency department with a nose bleed since 4am. He states that he got a nose 

bleed at 4am this morning and went to [facility] to get treatment where they 

packed his left nostril. He came to this ED because he was dissatisfied with 

the treatment he received at [facility] and wanted to know why he was having 

these nose bleeds. He states that he went to [facility] 3 days ago prior to 

this last visit for a nose bleed and they packed his nostril. But he ended up 

pulling out the packing yesterday because as he was not aware this was to be 

done by an ENT. 





The patient denies chest pain, shortness of breath, headache and dizziness. 

Denies fever, chills, nausea, vomit, diarrhea and constipation. 





Allergies: None 


Past surgical history: Abdominal hernia, CABG (x3)


Social history: No alcohol, tobacco or drug use reported 


PMD - Dr. Pepe Del Angel 





<Tommy Rodriguez - Last Filed: 04/18/17 10:46>





<Pinky Coulter - Last Filed: 04/19/17 09:43>





- General


Chief Complaint: Nasal Bleeding


Stated Complaint: NOSE BLEEDING


Time Seen by Provider: 04/18/17 10:07





Past History





<Tommy Rodriguez - Last Filed: 04/18/17 10:46>





- Past Medical History


Anemia: No


Asthma: No


Cancer: No


Cardiac Disorders: Yes (CABG 8/20/15)


CVA: No


COPD: No


CHF: No


Dementia: No


Diabetes: No


GI Disorders: Yes (REFLUX)


 Disorders: No


HTN: Yes


Hypercholesterolemia: Yes


Liver Disease: Yes (PAIN ON AND OFF)


Suicide Attempt (Hx): No


Seizures: No


Thyroid Disease: No





- Surgical History


Abdominal Surgery: Yes (HERNIA)


Appendectomy: No


Cardiac Surgery: Yes (CABG X 3)


Cholecystectomy: No


Lung Surgery: No


Neurologic Surgery: No


Orthopedic Surgery: No





- Psycho/Social/Smoking Cessation Hx


Anxiety: No


Suicidal Ideation: No


Smoking Status: No


Smoking History: Former smoker


Have you smoked in the past 12 months: No


Number of Cigarettes Smoked Daily: 0


If you are a former smoker, when did you quit?: 1990


Information on smoking cessation initiated: No


Hx Alcohol Use: No


Drug/Substance Use Hx: No


Substance Use Type: Alcohol


Hx Substance Use Treatment: No





<YfnPinky - Last Filed: 04/19/17 09:43>





- Past Medical History


Allergies/Adverse Reactions: 


 Allergies











Allergy/AdvReac Type Severity Reaction Status Date / Time


 


No Known Drug Allergies Allergy   Verified 04/18/17 09:50











Home Medications: 


Ambulatory Orders





Aspirin 325 mg PO DAILY 04/01/14 


Docusate Sodium [Colace -] 100 mg PO TID 09/01/15 


Metoprolol Tartrate [Lopressor -] 25 mg PO Q12H 09/01/15 


Mirtazapine [Remeron -] 7.5 mg PO DAILY #30 tablet 09/03/15 


Oxycodone HCl/Acetaminophen [Percocet 5-325 mg Tablet] 1 - 2 tab PO Q4H PRN #60 

tablet 09/03/15 


Atorvastatin Ca [Lipitor] 40 mg PO HS 09/30/15 


Colchicine [Colcrys] 0.6 mg PO DAILY 09/30/15 


Furosemide [Lasix -] 40 mg PO DAILY 09/30/15 


Ketoconazole 2% Cream [Nizoral 2% Cream -] 1 applic TP BID 09/30/15 


Ketorolac Tromethamine 10 mg PO Q6H PRN 09/30/15 


Lactulose 30 ml PO TID 09/30/15 


Omeprazole 20 mg PO DAILY 09/30/15 


Torsemide 20 mg PO DAILY 09/30/15 


Valsartan 160 mg PO DAILY 09/30/15 


Amoxicillin/Potassium Clav [Augmentin 875-125 Tablet] 1 each PO BID #10 tablet 

04/18/17 











**Review of Systems





- Review of Systems


Able to Perform ROS?: Yes


Comments:: 





04/18/17 10:46


GENERAL/CONSTITUTIONAL: No fever or chills. No weakness.


HEAD, EYES, EARS, NOSE AND THROAT: +Nose bleed. No change in vision. No ear 

pain or discharge. No sore throat.


CARDIOVASCULAR: No chest pain or shortness of breath


RESPIRATORY: No cough, wheezing, or hemoptysis.


GASTROINTESTINAL: No nausea, vomiting, diarrhea or constipation.


GENITOURINARY: No dysuria, frequency, or change in urination.


MUSCULOSKELETAL: No joint or muscle swelling or pain. No neck or back pain.


SKIN: No rash


NEUROLOGIC: No headache, vertigo, loss of consciousness, or change in strength/

sensation.


ENDOCRINE: No increased thirst. No abnormal weight change


HEMATOLOGIC/LYMPHATIC: No anemia, easy bleeding, or history of blood clots.


ALLERGIC/IMMUNOLOGIC: No hives or skin allergy.








<Tommy Rodriguez - Last Filed: 04/18/17 10:46>





*Physical Exam





- Vital Signs


 Last Vital Signs











Temp Pulse Resp BP Pulse Ox


 


 97.8 F   103 H     143/73   98 


 


 04/18/17 09:46  04/18/17 09:46     04/18/17 09:46  04/18/17 09:46














- Physical Exam


Comments: 


04/18/17 10:46


GENERAL: Awake, alert, and fully oriented, in no acute distress


ENT: Auricles normal inspection, hearing grossly normal, nares patent, 

oropharynx clear without


exudates. Moist mucosa. Left sided nasal packing in place, no active bleeding. 

Packing infused with blood. 











<Tommy Rodriguez - Last Filed: 04/18/17 10:46>





- Vital Signs


 Last Vital Signs











Temp Pulse Resp BP Pulse Ox


 


 97.8 F   103 H     143/73   98 


 


 04/18/17 09:46  04/18/17 09:46     04/18/17 09:46  04/18/17 09:46














<Pinky Coulter - Last Filed: 04/19/17 09:43>





Medical Decision Making





- Medical Decision Making


Discussion with patient at bedside. He mainly presented to the ED because he 

was not satisfied with his care at [facility], and was unclear about future 

management (he stated he thought he was supposed to remove the packing in 4 

hours). I clarified that it should stay in place for 3 days, and as such, I 

will not disturb it in the ED, as there is no bleeding through it. I placed him 

on augmentin for prevention of toxic shock. Gave him list of ENT providers for 

follow-up, so that he can obtain a timely appointment with a provider who 

accepts his insurance. After discussion, his concerns were addressed and he was 

comfortable with discharge. 








<Pinky Coulter - Last Filed: 04/19/17 09:43>





*DC/Admit/Observation/Transfer





- Attestations


Scribe Attestion: 





04/18/17 10:48


Documentation prepared by Tommy Rodriguez, acting as medical scribe for 

Pinky Coulter MD





<Tommy Rodriguez - Last Filed: 04/18/17 10:46>





- Discharge Dispostion


Admit: No





<Pinky Coulter - Last Filed: 04/19/17 09:43>


Diagnosis at time of Disposition: 


 Nasal bleeding





- Discharge Dispostion


Disposition: HOME


Condition at time of disposition: Stable





- Prescriptions


Prescriptions: 


Amoxicillin/Potassium Clav [Augmentin 875-125 Tablet] 1 each PO BID #10 tablet





- Referrals


Referrals: 


Altaf Herman MD [Staff Physician] - 





- Patient Instructions


Printed Discharge Instructions:  DI for Nosebleed


Additional Instructions: 


No retire el dispositivo de la nariz. El seguimiento con un especialista de la 

nariz para que se retire en 3 zavaleta.


Print Language: Serbian

## 2017-05-05 ENCOUNTER — HOSPITAL ENCOUNTER (OUTPATIENT)
Dept: HOSPITAL 74 - JER | Age: 72
Setting detail: OBSERVATION
LOS: 1 days | Discharge: HOME | End: 2017-05-06
Attending: INTERNAL MEDICINE | Admitting: INTERNAL MEDICINE
Payer: COMMERCIAL

## 2017-05-05 VITALS — BODY MASS INDEX: 28.3 KG/M2

## 2017-05-05 DIAGNOSIS — Z95.1: ICD-10-CM

## 2017-05-05 DIAGNOSIS — E78.5: ICD-10-CM

## 2017-05-05 DIAGNOSIS — I10: ICD-10-CM

## 2017-05-05 DIAGNOSIS — G89.12: ICD-10-CM

## 2017-05-05 DIAGNOSIS — R06.02: Primary | ICD-10-CM

## 2017-05-05 DIAGNOSIS — I25.2: ICD-10-CM

## 2017-05-05 DIAGNOSIS — I50.20: ICD-10-CM

## 2017-05-05 DIAGNOSIS — Z87.891: ICD-10-CM

## 2017-05-05 DIAGNOSIS — K21.9: ICD-10-CM

## 2017-05-05 DIAGNOSIS — I25.10: ICD-10-CM

## 2017-05-05 DIAGNOSIS — F41.9: ICD-10-CM

## 2017-05-05 DIAGNOSIS — Z79.82: ICD-10-CM

## 2017-05-05 LAB
ALBUMIN SERPL-MCNC: 3.4 G/DL (ref 3.4–5)
ALP SERPL-CCNC: 96 U/L (ref 45–117)
ALT SERPL-CCNC: 25 U/L (ref 12–78)
ANION GAP SERPL CALC-SCNC: 9 MMOL/L (ref 8–16)
AST SERPL-CCNC: 20 U/L (ref 15–37)
BASOPHILS # BLD: 0.6 % (ref 0–2)
BILIRUB SERPL-MCNC: 0.2 MG/DL (ref 0.2–1)
CALCIUM SERPL-MCNC: 8 MG/DL (ref 8.5–10.1)
CO2 SERPL-SCNC: 26 MMOL/L (ref 21–32)
COCKROFT - GAULT: 64.14
CREAT SERPL-MCNC: 1.1 MG/DL (ref 0.7–1.3)
DEPRECATED RDW RBC AUTO: 14 % (ref 11.9–15.9)
EOSINOPHIL # BLD: 1.4 % (ref 0–4.5)
GLUCOSE SERPL-MCNC: 101 MG/DL (ref 74–106)
MCH RBC QN AUTO: 29.7 PG (ref 25.7–33.7)
MCHC RBC AUTO-ENTMCNC: 33.4 G/DL (ref 32–35.9)
MCV RBC: 88.7 FL (ref 80–96)
NEUTROPHILS # BLD: 70.4 % (ref 42.8–82.8)
PLATELET # BLD AUTO: 189 K/MM3 (ref 134–434)
PMV BLD: 7.7 FL (ref 7.5–11.1)
PROT SERPL-MCNC: 6.6 G/DL (ref 6.4–8.2)
TROPONIN I SERPL-MCNC: < 0.02 NG/ML (ref 0–0.05)
WBC # BLD AUTO: 5.3 K/MM3 (ref 4–10)

## 2017-05-05 PROCEDURE — G0378 HOSPITAL OBSERVATION PER HR: HCPCS

## 2017-05-05 NOTE — PDOC
[id number]: No Limitations





- History of Present Illness


Initial Comments: 


05/05/17 12:30


The patient is a 72-year-old man, with a significant past medical history of 

hypertension, hypercholesterolemia, coronary artery disease (recent cardiac 

catherization approximately 3-4 months ago), gastroesophageal reflux disease 

who presents to the emergency department via EMS for further evaluation of 

shortness of breath today. Upon ED arrival, patient was noted to have an oxygen 

saturation of 100% on room air, heart rate of 72 bpm, respiratory rate of 20 

and a blood pressure of 161/73. Patient states that he underwent cardiac 

catherization approximately 3-4 months ago and ever since he has been short of 

breath. 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 He also reports that he has had two 

similar episodes in the past. This morning, he was extremely short of breath 

and EMS was activated. He also reports associated symptoms of an intermittent 

productive cough that sometimes is brown and at other times it is yellow in 

appearance. He denies leg swelling, but feels as if they are stiff. No other 

complaints. No fever, chills, chest pain, nausea, vomiting, diarrhea.





Allergies: No Known Drug Allergies


Past Surgical History: CABG. Hernia repair.


Social History: Former smoker. No EtOH and recreational drug use.


Primary Care Physician: Dr. Pepe Del Angel








<Moni Dasilva - Last Filed: 05/05/17 15:46>





<Pinky Coulter - Last Filed: 05/06/17 11:16>





- General


Chief Complaint: Respiratory


Stated Complaint: DIFFICULTY BREATHING


Time Seen by Provider: 05/05/17 11:49





Past History





<Moni Dasilva - Last Filed: 05/05/17 15:46>





- Past Medical History


Anemia: No


Asthma: No


Cancer: No


Cardiac Disorders: Yes (CABG 8/20/15)


CVA: No


COPD: No


CHF: No


Dementia: No


Diabetes: No


GI Disorders: Yes (REFLUX)


 Disorders: No


HTN: Yes


Hypercholesterolemia: Yes


Liver Disease: Yes (PAIN ON AND OFF)


Suicide Attempt (Hx): No


Seizures: No


Thyroid Disease: No





- Surgical History


Abdominal Surgery: Yes (HERNIA)


Appendectomy: No


Cardiac Surgery: Yes (CABG X 3)


Cholecystectomy: No


Lung Surgery: No


Neurologic Surgery: No


Orthopedic Surgery: No





- Psycho/Social/Smoking Cessation Hx


Anxiety: No


Suicidal Ideation: No


Smoking Status: No


Smoking History: Former smoker


Have you smoked in the past 12 months: No


Number of Cigarettes Smoked Daily: 0


If you are a former smoker, when did you quit?: 1990


Information on smoking cessation initiated: No


Hx Alcohol Use: No


Drug/Substance Use Hx: No


Substance Use Type: None


Hx Substance Use Treatment: No





<Pinky Coulter - Last Filed: 05/06/17 11:16>





- Past Medical History


Allergies/Adverse Reactions: 


 Allergies











Allergy/AdvReac Type Severity Reaction Status Date / Time


 


No Known Drug Allergies Allergy   Verified 05/05/17 19:39











Home Medications: 


Ambulatory Orders





Aspirin 325 mg PO DAILY 04/01/14 


Docusate Sodium [Colace -] 100 mg PO TID 09/01/15 


Metoprolol Tartrate [Lopressor -] 25 mg PO Q12H 09/01/15 


Mirtazapine [Remeron -] 7.5 mg PO DAILY #30 tablet 09/03/15 


Oxycodone HCl/Acetaminophen [Percocet 5-325 mg Tablet] 1 - 2 tab PO Q4H PRN #60 

tablet 09/03/15 


Atorvastatin Ca [Lipitor] 40 mg PO HS 09/30/15 


Colchicine [Colcrys] 0.6 mg PO DAILY 09/30/15 


Furosemide [Lasix -] 40 mg PO DAILY 09/30/15 


Ketoconazole 2% Cream [Nizoral 2% Cream -] 1 applic TP BID 09/30/15 


Ketorolac Tromethamine 10 mg PO Q6H PRN 09/30/15 


Lactulose 30 ml PO TID 09/30/15 


Omeprazole 20 mg PO DAILY 09/30/15 


Torsemide 20 mg PO DAILY 09/30/15 


Valsartan 160 mg PO DAILY 09/30/15 











**Review of Systems





- Review of Systems


Able to Perform ROS?: Yes


Comments:: 


05/05/17 12:30


GENERAL/CONSTITUTIONAL: No fever or chills. No weakness.


HEAD, EYES, EARS, NOSE AND THROAT: No change in vision. No ear pain or 

discharge. No sore throat.


CARDIOVASCULAR: Yes: Shortness of breath. No chest pain.


RESPIRATORY: Yes: +Cough. No wheezing, or hemoptysis.


GASTROINTESTINAL: No nausea, vomiting, diarrhea or constipation.


GENITOURINARY: No dysuria, frequency, or change in urination.


MUSCULOSKELETAL: No joint or muscle swelling or pain. No neck or back pain.


SKIN: No rash


NEUROLOGIC: No headache, vertigo, loss of consciousness, or change in strength/

sensation.


ENDOCRINE: No increased thirst. No abnormal weight change.


HEMATOLOGIC/LYMPHATIC: No anemia, easy bleeding, or history of blood clots.


ALLERGIC/IMMUNOLOGIC: No hives or skin allergy.








<Moni Dasilva - Last Filed: 05/05/17 15:46>





*Physical Exam





- Vital Signs


 Last Vital Signs











Temp Pulse Resp BP Pulse Ox


 


 98.2 F   72   20   161/73   100 


 


 05/05/17 11:46  05/05/17 11:46  05/05/17 11:46  05/05/17 11:46  05/05/17 11:46














- Physical Exam


Comments: 


05/05/17 12:30


GENERAL: Awake, alert, and fully oriented, in no acute distress 


HEAD: No signs of trauma


EYES: PERRLA, EOMI, sclera anicteric, conjunctiva clear


ENT: Auricles normal inspection, hearing grossly normal, nares patent, 

oropharynx clear without exudates. Moist mucosa


NECK: Normal ROM, supple, no lymphadenopathy, JVD, or masses


LUNGS: Mild tachypnea but otherwise, breath sounds equal, clear to auscultation 

bilaterally.  No wheezes, and no crackles


HEART: Regular rate and rhythm, normal S1 and S2, no murmurs, rubs or gallops


ABDOMEN: Soft, nontender, normoactive bowel sounds.  No guarding, no rebound.  

No masses


EXTREMITIES: Normal range of motion, no edema.  No clubbing or cyanosis. No 

cords, erythema, or tenderness


NEUROLOGICAL: Cranial nerves II through XII grossly intact.  Normal speech





<Moni Dasilva - Last Filed: 05/05/17 15:46>





- Vital Signs


 Last Vital Signs











Temp Pulse Resp BP Pulse Ox


 


 98.2 F   72   20   161/73   100 


 


 05/05/17 11:46  05/05/17 11:46  05/05/17 11:46  05/05/17 11:46  05/05/17 11:46














<Pinky Coulter - Last Filed: 05/06/17 11:16>





ED Treatment Course





- LABORATORY


CBC & Chemistry Diagram: 


 05/05/17 12:15





 05/05/17 12:15





- ADDITIONAL ORDERS


Additional order review: 


 











  05/05/17





  12:15


 


RBC  3.70 L


 


MCV  88.7


 


MCHC  33.4


 


RDW  14.0


 


MPV  7.7  D


 


Neutrophils %  70.4


 


Lymphocytes %  20.6


 


Monocytes %  7.0


 


Eosinophils %  1.4


 


Basophils %  0.6














- RADIOLOGY


Radiograph Interpretation: 


05/05/17 14:30


EXAM: RAD/CHEST X-RAY PORTABLE


IMPRESSION:  Frontal view of the chest provided. Prior study is dated November 

10, 2016. Cardiac silhouette is upper limits of normal in size. Sternotomy 

wires are noted. There is mild increased perihilar lung markings which may 

reflect mild congestive changes. There is elevation of the right hemidiaphragm. 

There are degenerative changes of the spine. 








<Moni Dasilva - Last Filed: 05/05/17 15:46>





- LABORATORY


CBC & Chemistry Diagram: 


 05/05/17 12:15





 05/05/17 12:15





<Pinky Coulter - Last Filed: 05/06/17 11:16>





Medical Decision Making





- Medical Decision Making


05/05/17 15:07


Paged Dr. Pinky Delgado.





05/05/17 15:32


Second page to Dr. Pinky Delgado.





05/05/17 15:46


Paged Dr. Pinky Delgado to her mobile phone.





<Moni Dasilva - Last Filed: 05/05/17 15:46>





- Medical Decision Making


Pt with significant cardiac history presents with SOB this morning, now 

resolved. D/w Dr. Delgado, will admit and obtain cardiology consultation.








<Pinky Coulter - Last Filed: 05/06/17 11:16>





*DC/Admit/Observation/Transfer





- Attestations


Scribe Attestion: 


05/05/17 12:30


Documentation prepared by Moni Dasilva, acting as medical scribe for 

Pinky Coulter MD.





<Moni Dasilva - Last Filed: 05/05/17 15:46>





- Discharge Dispostion


Admit: Yes





<Pinky Coulter - Last Filed: 05/06/17 11:16>


Diagnosis at time of Disposition: 


 Shortness of breath





- Discharge Dispostion


Condition at time of disposition: Stable





- Referrals

## 2017-05-06 VITALS — TEMPERATURE: 97.9 F

## 2017-05-06 VITALS — SYSTOLIC BLOOD PRESSURE: 148 MMHG | DIASTOLIC BLOOD PRESSURE: 72 MMHG | HEART RATE: 56 BPM

## 2017-05-06 LAB — TROPONIN I SERPL-MCNC: < 0.02 NG/ML (ref 0–0.05)

## 2017-05-06 NOTE — CON.CARD
Consult


Consult Specialty:: cardiology





- History of Present Illness


History of Present Illness: 


The patient is a 72-year-old man, with a significant past medical history of 

hypertension, hypercholesterolemia, coronary artery disease (s/p CABG; cardiac 

catherization approximately 3-4 months ago), gastroesophageal reflux disease 

who presents to the emergency department via EMS for further evaluation of 

shortness of breath today. Upon ED arrival, patient was noted to have an oxygen 

saturation of 100% on room air, heart rate of 72 bpm, respiratory rate of 20 

and a blood pressure of 161/73. Patient states that he underwent cardiac 

catherization approximately 3-4 months ago and ever since he has been short of 

breath. 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 He also reports that he has had two 

similar episodes in the past. This morning, he was extremely short of breath 

and EMS was activated. He also reports associated symptoms of an intermittent 

productive cough that sometimes is brown and at other times it is yellow in 

appearance. He denies leg swelling, but feels as if they are stiff. No other 

complaints. No fever, chills, chest pain, nausea, vomiting, diarrhea.





Allergies: No Known Drug Allergies


Past Surgical History: CABG. Hernia repair.


Social History: Former smoker. No EtOH and recreational drug use.


Primary Care Physician: Dr. Pepe Del Angel





- Past Medical History


Cardio/Vascular: Yes: CAD, HTN, Hyperlipdemia, MI


Pulmonary: Yes: Other (Elevated right hemidiaphragm)





- Past Surgical History


Past Surgical History: Yes: CABG





- Alcohol/Substance Use


Hx Alcohol Use: No





- Smoking History


Smoking history: Former smoker


Have you smoked in the past 12 months: No


Aproximately how many cigarettes per day: 0


If you are a former smoker, when did you quit?: 1990





- Social History


Usual Living Arrangement: With Spouse


ADL: Family Assistance


History of Recent Travel: No





Home Medications





- Allergies


Allergies/Adverse Reactions: 


 Allergies











Allergy/AdvReac Type Severity Reaction Status Date / Time


 


No Known Drug Allergies Allergy   Verified 05/05/17 19:39














- Home Medications


Home Medications: 


Ambulatory Orders





Aspirin 325 mg PO DAILY 04/01/14 


Docusate Sodium [Colace -] 100 mg PO TID 09/01/15 


Metoprolol Tartrate [Lopressor -] 25 mg PO Q12H 09/01/15 


Mirtazapine [Remeron -] 7.5 mg PO DAILY #30 tablet 09/03/15 


Oxycodone HCl/Acetaminophen [Percocet 5-325 mg Tablet] 1 - 2 tab PO Q4H PRN #60 

tablet 09/03/15 


Atorvastatin Ca [Lipitor] 40 mg PO HS 09/30/15 


Colchicine [Colcrys] 0.6 mg PO DAILY 09/30/15 


Furosemide [Lasix -] 40 mg PO DAILY 09/30/15 


Ketoconazole 2% Cream [Nizoral 2% Cream -] 1 applic TP BID 09/30/15 


Ketorolac Tromethamine 10 mg PO Q6H PRN 09/30/15 


Lactulose 30 ml PO TID 09/30/15 


Omeprazole 20 mg PO DAILY 09/30/15 


Torsemide 20 mg PO DAILY 09/30/15 


Valsartan 160 mg PO DAILY 09/30/15 








Vital Signs: 


 Vital Signs











Temperature  98.2 F   05/06/17 08:00


 


Pulse Rate  74   05/06/17 08:00


 


Respiratory Rate  16   05/06/17 08:00


 


Blood Pressure  150/80   05/06/17 08:00


 


O2 Sat by Pulse Oximetry (%)  100   05/05/17 22:00














Problem List





- Problems


(1) Shortness of breath


Code(s): R06.02 - SHORTNESS OF BREATH





(2) CAD (coronary artery disease)


Code(s): I25.10 - ATHSCL HEART DISEASE OF NATIVE CORONARY ARTERY W/O ANG PCTRS 

  Qualifiers: 


     Coronary Disease-Associated Artery/Lesion type: bypass graft     

Associated angina: with stable angina





(3) Chest wall pain following surgery


Assessment/Plan: 


Atypical chest pain (sharp; occurs "always" since CABG).


Pt's chest pain is reproduced by palpation of chest wall or by moving left arm.


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


EKG: NSR; old IW changes; lateral T wave changes.


TNI < 0.02; f/u 2nd TNI, and if remains normal, pt may be followed as 

outpatient.


He would benefit from cardiac rehabilitation.


Code(s): G89.12 - ACUTE POST-THORACOTOMY PAIN





(4) Hypertension


Assessment/Plan: 


on metoprolol and valsartan.


Code(s): I10 - ESSENTIAL (PRIMARY) HYPERTENSION   





(5) Systolic CHF


Assessment/Plan: 


mildly reduced LVEF by ECHO 2016.


Continue metoprolol and valsartan.


Code(s): I50.20 - UNSPECIFIED SYSTOLIC (CONGESTIVE) HEART FAILURE   





(6) Anxiety


Code(s): F41.9 - ANXIETY DISORDER, UNSPECIFIED

## 2017-05-06 NOTE — EKG
Test Reason : 

Blood Pressure : ***/*** mmHG

Vent. Rate : 068 BPM     Atrial Rate : 068 BPM

   P-R Int : 144 ms          QRS Dur : 100 ms

    QT Int : 410 ms       P-R-T Axes : 025 030 -41 degrees

   QTc Int : 435 ms

 

NORMAL SINUS RHYTHM

INFERIOR INFARCT (CITED ON OR BEFORE 01-SEP-2015)

T WAVE ABNORMALITY, CONSIDER LATERAL ISCHEMIA

ABNORMAL ECG

WHEN COMPARED WITH ECG OF 10-NOV-2016 16:01,

VENT. RATE HAS DECREASED BY  55 BPM

T WAVE INVERSION NOW EVIDENT IN LATERAL LEADS

Confirmed by ZHOU GARCIA, WANDA ([id number]) on 5/6/2017 9:21:10 AM

 

Referred By:             Confirmed By:WANDA EPPERSON MD

## 2017-05-06 NOTE — HP
Admitting History and Physical





- Admission


History of Present Illness: 


The patient is a 72-year-old man, with a significant past medical history of 

hypertension, hypercholesterolemia, coronary artery disease (recent cardiac 

catherization approximately 3-4 months ago), gastroesophageal reflux disease 

who presents to the emergency department via EMS for further evaluation of 

shortness of breath today. Upon ED arrival, patient was noted to have an oxygen 

saturation of 100% on room air, heart rate of 72 bpm, respiratory rate of 20 

and a blood pressure of 161/73. Patient states that he underwent cardiac 

catherization approximately 3-4 months ago and ever since he has been short of 

breath. 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 He also reports that he has had two 

similar episodes in the past. This morning, he was extremely short of breath 

and EMS was activated. He also reports associated symptoms of an intermittent 

productive cough that sometimes is brown and at other times it is yellow in 

appearance. He denies leg swelling, but feels as if they are stiff. No other 

complaints. No fever, chills, chest pain, nausea, vomiting, diarrhea.





- Past Medical History


Cardiovascular: Yes: CAD, HTN, Hyperlipdemia, MI


Pulmonary: Yes: Other (Elevated right hemidiaphragm)





- Past Surgical History


Past Surgical History: Yes: CABG





- Smoking History


Smoking history: Former smoker


Have you smoked in the past 12 months: No


Aproximately how many cigarettes per day: 0


If you are a former smoker, when did you quit?: 1990





- Alcohol/Substance Use


Hx Alcohol Use: No





- Social History


ADL: Family Assistance


History of Recent Travel: No





Home Medications





- Allergies


Allergies/Adverse Reactions: 


 Allergies











Allergy/AdvReac Type Severity Reaction Status Date / Time


 


No Known Drug Allergies Allergy   Verified 05/05/17 19:39














- Home Medications


Home Medications: 


Ambulatory Orders





Aspirin 325 mg PO DAILY 04/01/14 


Docusate Sodium [Colace -] 100 mg PO TID 09/01/15 


Metoprolol Tartrate [Lopressor -] 25 mg PO Q12H 09/01/15 


Mirtazapine [Remeron -] 7.5 mg PO DAILY #30 tablet 09/03/15 


Atorvastatin Ca [Lipitor] 40 mg PO HS 09/30/15 


Furosemide [Lasix -] 40 mg PO DAILY 09/30/15 


Ketoconazole 2% Cream [Nizoral 2% Cream -] 1 applic TP BID 09/30/15 


Lactulose 30 ml PO TID 09/30/15 


Omeprazole 20 mg PO DAILY 09/30/15 


Torsemide 20 mg PO DAILY 09/30/15 


Valsartan 160 mg PO DAILY 09/30/15 











Family Disease History





- Family Disease History


Family History: Unremarkable





Review of Systems





- Review of Systems


Constitutional: reports: No Symptoms


Eyes: reports: No Symptoms


HENT: reports: No Symptoms


Neck: reports: No Symptoms


Cardiovascular: reports: Chest Pain


Respiratory: reports: No Symptoms


Gastrointestinal: reports: No Symptoms





Physical Examination


Vital Signs: 


 Vital Signs











Temperature  97.9 F   05/06/17 14:00


 


Pulse Rate  62   05/06/17 14:00


 


Respiratory Rate  20   05/06/17 14:00


 


Blood Pressure  137/86   05/06/17 14:00


 


O2 Sat by Pulse Oximetry (%)  96   05/06/17 09:00











Constitutional: Yes: Calm


Eyes: Yes: WNL


HENT: Yes: WNL


Neck: Yes: WNL, Supple


Cardiovascular: Yes: WNL, Regular Rate and Rhythm


Respiratory: Yes: WNL, Regular, CTA Bilaterally


Gastrointestinal: Yes: WNL, Normal Bowel Sounds, Soft


Musculoskeletal: Yes: WNL


Extremities: Yes: WNL


Edema: No


Neurological: Yes: WNL, Alert, Oriented


...Motor Strength: WNL





Problem List





- Problems


(1) Chest pain


Assessment/Plan: 


Admit to tele


Serial cpk/troponin to r/o ACS


As pr cardio





Code(s): R07.9 - CHEST PAIN, UNSPECIFIED   





(2) CAD (coronary artery disease)


Code(s): I25.10 - ATHSCL HEART DISEASE OF NATIVE CORONARY ARTERY W/O ANG PCTRS 

  Qualifiers: 


     Coronary Disease-Associated Artery/Lesion type: bypass graft     

Associated angina: with stable angina





(3) Hyperlipidemia


Code(s): E78.5 - HYPERLIPIDEMIA, UNSPECIFIED   





(4) Systolic CHF


Code(s): I50.20 - UNSPECIFIED SYSTOLIC (CONGESTIVE) HEART FAILURE

## 2019-03-23 ENCOUNTER — HOSPITAL ENCOUNTER (EMERGENCY)
Dept: HOSPITAL 74 - JERFT | Age: 74
Discharge: HOME | End: 2019-03-23
Payer: COMMERCIAL

## 2019-03-23 VITALS — TEMPERATURE: 97.5 F | DIASTOLIC BLOOD PRESSURE: 58 MMHG | HEART RATE: 67 BPM | SYSTOLIC BLOOD PRESSURE: 153 MMHG

## 2019-03-23 VITALS — BODY MASS INDEX: 30.9 KG/M2

## 2019-03-23 DIAGNOSIS — Z95.1: ICD-10-CM

## 2019-03-23 DIAGNOSIS — E78.00: ICD-10-CM

## 2019-03-23 DIAGNOSIS — Z98.61: ICD-10-CM

## 2019-03-23 DIAGNOSIS — K59.00: ICD-10-CM

## 2019-03-23 DIAGNOSIS — I25.10: ICD-10-CM

## 2019-03-23 DIAGNOSIS — I10: ICD-10-CM

## 2019-03-23 DIAGNOSIS — K62.89: Primary | ICD-10-CM

## 2019-03-23 NOTE — PDOC
History of Present Illness





- General


Chief Complaint: Pain, Acute


Stated Complaint: Bleeding from Anus


Time Seen by Provider: 03/23/19 09:31


History Source: Patient


Exam Limitations: No Limitations





- History of Present Illness


Initial Comments: 





03/23/19 09:59


73 year old male with medical history of HTN, high cholesterol, hermorrhoids, 

surgical history of open heart surgery, catherization x 2 and hernia repair, 

presents for rectal pain.  Patient states he has chronic constipation, using 

enemas sometimes two times daily.  States he is taking stool softeners but 

still has a lot of constipation. Also reports that he feels his prostate might 

be the cause of his discomfort. 


Timing/Duration: reports: getting worse


Quality: reports: moderate, cramping, fullness


Abdominal Pain Onset Location: reports: other (rectal)


Pain Radiation: reports: groin


Activities at Onset: reports: none


Treatment  Prior to Arrive: improves with: analgesics, laxative, enema


Aggravating Factors: improves with: Defecation


Alleviating Factors: improves with: Defecation





Past History





- Travel


Traveled outside of the country in the last 30 days: No


Close contact w/someone who was outside of country & ill: No





- Past Medical History


Allergies/Adverse Reactions: 


 Allergies











Allergy/AdvReac Type Severity Reaction Status Date / Time


 


No Known Drug Allergies Allergy   Verified 03/23/19 09:16











Home Medications: 


Ambulatory Orders





Aspirin 325 mg PO DAILY 04/01/14 


Docusate Sodium [Colace -] 100 mg PO TID 09/01/15 


Metoprolol Tartrate [Lopressor -] 25 mg PO Q12H 09/01/15 


Mirtazapine [Remeron -] 7.5 mg PO DAILY #30 tablet 09/03/15 


Atorvastatin Ca [Lipitor] 40 mg PO HS 09/30/15 


Furosemide [Lasix -] 40 mg PO DAILY 09/30/15 


Ketoconazole 2% Cream [Nizoral 2% Cream -] 1 applic TP BID 09/30/15 


Lactulose 30 ml PO TID 09/30/15 


Omeprazole 20 mg PO DAILY 09/30/15 


Torsemide 20 mg PO DAILY 09/30/15 


Valsartan 160 mg PO DAILY 09/30/15 


Hydrocortisone Acetate [Anusol Hc Suppository -] 25 mg RC DAILY #14 supp.rect 03 /23/19 








Anemia: No


Asthma: No


Cancer: No


Cardiac Disorders: Yes (CABG 8/20/15)


CVA: No


COPD: No


CHF: No


Dementia: No


Diabetes: No


GI Disorders: Yes (REFLUX)


 Disorders: No


HTN: Yes


Hypercholesterolemia: Yes


Liver Disease: Yes (PAIN ON AND OFF)


Seizures: No


Thyroid Disease: No





- Surgical History


Abdominal Surgery: Yes (HERNIA)


Appendectomy: No


Cardiac Surgery: Yes (CABG X 3)


Cholecystectomy: No


Lung Surgery: No


Neurologic Surgery: No


Orthopedic Surgery: No





- Immunization History


Immunization Up to Date: Yes





- Suicide/Smoking/Psychosocial Hx


Smoking Status: No


Smoking History: Never smoked


Have you smoked in the past 12 months: No


Number of Cigarettes Smoked Daily: 0


If you are a former smoker, when did you quit?: 1990


Hx Alcohol Use: No


Drug/Substance Use Hx: No


Substance Use Type: None


Hx Substance Use Treatment: No





Abd/GI Specific PMHX





- Complaint Specific PMHX


Colitis: No


Diverticulitis: No


Gall Bladder Disease: No


GERD: No


Hepatitis: No


Irritable Bowel Synd (IBS): No


Pancreatitis: No


GI Ulcer Disease: No





**Review of Systems





- Review of Systems


Able to Perform ROS?: Yes


Is the patient limited English proficient: No


Constitutional: No: Chills, Fever


HEENTM: No: Ear Discharge, Nose Pain, Throat Swelling


Respiratory: No: Orthopnea, Shortness of Breath, Wheezing


Cardiac (ROS): No: Chest Pain, Edema


ABD/GI: Yes: Abd. Pain w/ defecation, Constipated.  No: Diarrhea, Vomiting


: No: Burning, Dysuria, Incontinence


Musculoskeletal: No: Gout, Joint Pain, Muscle Weakness


Integumentary: No: Erythema, Flushing


Neurological: No: Headache, Numbness, Tingling


Psychiatric: No: Stressors


Endocrine: No: Increased Urine





*Physical Exam





- Vital Signs


 Last Vital Signs











Temp Pulse Resp BP Pulse Ox


 


 97.5 F L  67   16   153/58 L  98 


 


 03/23/19 09:16  03/23/19 09:16  03/23/19 09:16  03/23/19 09:16  03/23/19 09:16














- Physical Exam


General Appearance: Yes: Nourished, Appropriately Dressed


HEENT: positive: TMs Normal, Pharynx Normal


Neck: positive: Supple.  negative: Lymphadenopathy (R), Lymphadenopathy (L)


Respiratory/Chest: positive: Lungs Clear, Normal Breath Sounds


Cardiovascular: positive: Regular Rhythm, Regular Rate


Male Genitalia: positive: normal genitalia


Rectal Exam: positive: normal rectal tone, hemorrhoids, other (irritation of 

skin around anus, + hemorrhoids externally, fullness felt internally, + 

enlarged prostate non tender)





Moderate Sedation





- Procedure Monitoring


Vital Signs: 


Procedure Monitoring Vital Signs











Temperature  97.5 F L  03/23/19 09:16


 


Pulse Rate  67   03/23/19 09:16


 


Respiratory Rate  16   03/23/19 09:16


 


Blood Pressure  153/58 L  03/23/19 09:16


 


O2 Sat by Pulse Oximetry (%)  98   03/23/19 09:16











Medical Decision Making





- Medical Decision Making





03/23/19 10:04


73 year old male with medical history of HTN, high cholesterol, hermorrhoids, 

surgical history of open heart surgery, catherization x 2 and hernia repair, 

presents for rectal pain.





Plan:


annusol HC suppositories


encouraged to not use enema as frequently


discussed high fiber diet


referred to urologist





*DC/Admit/Observation/Transfer


Diagnosis at time of Disposition: 


 Rectal pain, chronic





Constipation


Qualifiers:


 Constipation type: unspecified constipation type Qualified Code(s): K59.00 - 

Constipation, unspecified








- Discharge Dispostion


Disposition: HOME


Condition at time of disposition: Good


Decision to Admit order: No





- Prescriptions


Prescriptions: 


Hydrocortisone Acetate [Anusol Hc Suppository -] 25 mg RC DAILY #14 supp.rect





- Referrals


Referrals: 


Julianne Del Angel [Primary Care Provider] - 3 days


()


Ranulfo Luciano MD [Staff Physician] - 3 days (to check prostate)





- Patient Instructions


Printed Discharge Instructions:  DI for Constipation, Increased Dietary Fiber 

May Improve Constipation Conditions With Pelvic Ivan


Additional Instructions: 


Drink plenty water


Eat foods high in fiber


Call urologist for follow up appointment to discuss issues with prostate


Return for blood in stool or bleeding from retum


Print Language: Colombian





- Post Discharge Activity

## 2019-11-18 ENCOUNTER — APPOINTMENT (OUTPATIENT)
Dept: HEART AND VASCULAR | Facility: CLINIC | Age: 74
End: 2019-11-18
Payer: COMMERCIAL

## 2019-11-18 DIAGNOSIS — I25.10 ATHEROSCLEROTIC HEART DISEASE OF NATIVE CORONARY ARTERY W/OUT ANGINA PECTORIS: ICD-10-CM

## 2019-11-18 PROCEDURE — 99204 OFFICE O/P NEW MOD 45 MIN: CPT

## 2019-11-18 RX ORDER — METOPROLOL SUCCINATE 25 MG/1
25 TABLET, EXTENDED RELEASE ORAL
Qty: 30 | Refills: 3 | Status: ACTIVE | COMMUNITY
Start: 2019-11-18 | End: 1900-01-01

## 2019-11-19 VITALS — HEART RATE: 72 BPM | DIASTOLIC BLOOD PRESSURE: 68 MMHG | SYSTOLIC BLOOD PRESSURE: 110 MMHG

## 2019-11-19 NOTE — HISTORY OF PRESENT ILLNESS
[FreeTextEntry1] : 74-year-old man with past medical history of coronary artery disease status CABG in 2016 and s/p PCI with YAO SVG to OM1 and prox and mid LAD PTCA on 01/27/2017, history of ischemic cardiomyopathy, NSVT, PSVT here for first evaluation with this provider. \par The patient was previously followed by Dr. Delgado\par The patient reports the chronic left sided pinpoint pain is unchanged since his surgery.\par Reports unlimited exercise tolerance, denies any chest pain when walking\par Compliant with his medications but reports that he is not taking aspirin and metoprolol.\par \par \par PMH as above\par \par ALL\par NKDA\par \par MEDICATIONS \par amlodipine 5 mg daily\par Atorvastatin 80 mg daily\par ranolazine  100 mg twice a day\par \par FH\par no family history of CAD\par \par SH\par no smoke, no etoh, no drugs\par \par EKG 11/18/2019\par SR, wnl\par \par \par ECHO 3/7/2019\par normal LVEF, mild LVH\par \par NUC STRESS TEST\par 3/15/2019\par large severe fixed defect inferolateral and inferoseptal defect c/w prior infarct and mild perinfarct uschemia\par LVEF 51%

## 2019-11-19 NOTE — ASSESSMENT
[FreeTextEntry1] : 74-year-old man with past medical history of coronary artery disease status CABG in 2016 here for first evaluation with this provider\par \par \par CAD s/p CABG and PCI\par -currently describing atypical symptoms, likely non anginal \par -nuc stress test with only mild ischemia\par -recc to start asprin 81 mg daily \par -start toprol X 25 mg daily \par -cont with amlodipine and ranolazine\par -cont with statin\par -will evaluate repeat cath if symptoms persist on current antianginal therapy\par -recc med compliance \par \par HTN\par -well controlled \par -cont with current medications\par -recc to monitor BP particualrly after startin betablocker\par -CMP\par \par HLD\par -obtain fasting lipid panel\par -cont with statin\par \par \par -f/u in 3 weeks

## 2019-11-19 NOTE — PHYSICAL EXAM
[General Appearance - Well Developed] : well developed [Normal Appearance] : normal appearance [Well Groomed] : well groomed [General Appearance - Well Nourished] : well nourished [No Deformities] : no deformities [General Appearance - In No Acute Distress] : no acute distress [Normal Oral Mucosa] : normal oral mucosa [No Oral Pallor] : no oral pallor [No Oral Cyanosis] : no oral cyanosis [Normal Jugular Venous A Waves Present] : normal jugular venous A waves present [No Jugular Venous Alcaraz A Waves] : no jugular venous alcaraz A waves [Normal Jugular Venous V Waves Present] : normal jugular venous V waves present [Heart Rate And Rhythm] : heart rate and rhythm were normal [Heart Sounds] : normal S1 and S2 [Murmurs] : no murmurs present [Respiration, Rhythm And Depth] : normal respiratory rhythm and effort [Exaggerated Use Of Accessory Muscles For Inspiration] : no accessory muscle use [Auscultation Breath Sounds / Voice Sounds] : lungs were clear to auscultation bilaterally [Abdomen Soft] : soft [Abdomen Tenderness] : non-tender [Nail Clubbing] : no clubbing of the fingernails [Abdomen Mass (___ Cm)] : no abdominal mass palpated [Cyanosis, Localized] : no localized cyanosis [Petechial Hemorrhages (___cm)] : no petechial hemorrhages [] : no ischemic changes [Oriented To Time, Place, And Person] : oriented to person, place, and time [Mood] : the mood was normal [Affect] : the affect was normal [No Anxiety] : not feeling anxious

## 2019-11-19 NOTE — PHYSICAL EXAM
[General Appearance - Well Developed] : well developed [Normal Appearance] : normal appearance [Well Groomed] : well groomed [General Appearance - Well Nourished] : well nourished [No Deformities] : no deformities [General Appearance - In No Acute Distress] : no acute distress [Normal Oral Mucosa] : normal oral mucosa [No Oral Pallor] : no oral pallor [No Oral Cyanosis] : no oral cyanosis [Normal Jugular Venous A Waves Present] : normal jugular venous A waves present [No Jugular Venous Alcaraz A Waves] : no jugular venous alcaraz A waves [Normal Jugular Venous V Waves Present] : normal jugular venous V waves present [Heart Rate And Rhythm] : heart rate and rhythm were normal [Murmurs] : no murmurs present [Heart Sounds] : normal S1 and S2 [Respiration, Rhythm And Depth] : normal respiratory rhythm and effort [Exaggerated Use Of Accessory Muscles For Inspiration] : no accessory muscle use [Auscultation Breath Sounds / Voice Sounds] : lungs were clear to auscultation bilaterally [Abdomen Soft] : soft [Abdomen Tenderness] : non-tender [Abdomen Mass (___ Cm)] : no abdominal mass palpated [Nail Clubbing] : no clubbing of the fingernails [Cyanosis, Localized] : no localized cyanosis [Petechial Hemorrhages (___cm)] : no petechial hemorrhages [] : no ischemic changes [Oriented To Time, Place, And Person] : oriented to person, place, and time [Affect] : the affect was normal [Mood] : the mood was normal [No Anxiety] : not feeling anxious

## 2019-12-09 ENCOUNTER — APPOINTMENT (OUTPATIENT)
Dept: HEART AND VASCULAR | Facility: CLINIC | Age: 74
End: 2019-12-09

## 2019-12-30 ENCOUNTER — APPOINTMENT (OUTPATIENT)
Dept: HEART AND VASCULAR | Facility: CLINIC | Age: 74
End: 2019-12-30
Payer: COMMERCIAL

## 2019-12-30 VITALS — DIASTOLIC BLOOD PRESSURE: 70 MMHG | SYSTOLIC BLOOD PRESSURE: 144 MMHG | HEART RATE: 66 BPM

## 2019-12-30 DIAGNOSIS — I10 ESSENTIAL (PRIMARY) HYPERTENSION: ICD-10-CM

## 2019-12-30 PROCEDURE — 99215 OFFICE O/P EST HI 40 MIN: CPT

## 2019-12-30 RX ORDER — AMLODIPINE BESYLATE 10 MG/1
10 TABLET ORAL DAILY
Qty: 30 | Refills: 3 | Status: DISCONTINUED | COMMUNITY
Start: 2019-12-30 | End: 2019-12-30

## 2019-12-31 NOTE — PHYSICAL EXAM
[General Appearance - Well Developed] : well developed [Normal Appearance] : normal appearance [Well Groomed] : well groomed [General Appearance - Well Nourished] : well nourished [No Deformities] : no deformities [General Appearance - In No Acute Distress] : no acute distress [Normal Oral Mucosa] : normal oral mucosa [No Oral Cyanosis] : no oral cyanosis [No Oral Pallor] : no oral pallor [Normal Jugular Venous V Waves Present] : normal jugular venous V waves present [Normal Jugular Venous A Waves Present] : normal jugular venous A waves present [No Jugular Venous Alcaraz A Waves] : no jugular venous alcaraz A waves [Heart Rate And Rhythm] : heart rate and rhythm were normal [Murmurs] : no murmurs present [Heart Sounds] : normal S1 and S2 [Exaggerated Use Of Accessory Muscles For Inspiration] : no accessory muscle use [Respiration, Rhythm And Depth] : normal respiratory rhythm and effort [Auscultation Breath Sounds / Voice Sounds] : lungs were clear to auscultation bilaterally [Abdomen Soft] : soft [Abdomen Tenderness] : non-tender [Abdomen Mass (___ Cm)] : no abdominal mass palpated [Nail Clubbing] : no clubbing of the fingernails [Cyanosis, Localized] : no localized cyanosis [Petechial Hemorrhages (___cm)] : no petechial hemorrhages [] : no ischemic changes [Oriented To Time, Place, And Person] : oriented to person, place, and time [Affect] : the affect was normal [Mood] : the mood was normal [No Anxiety] : not feeling anxious [FreeTextEntry1] : 1+ pitting edema b/l

## 2019-12-31 NOTE — HISTORY OF PRESENT ILLNESS
[FreeTextEntry1] : 74-year-old man with past medical history of coronary artery disease status CABG in 2016 and s/p PCI with YAO SVG to OM1 and prox and mid LAD PTCA on 01/27/2017, history of ischemic cardiomyopathy, NSVT, PSVT here for follow up. \par \par The patient reports the chronic left sided pinpoint pain is unchanged since his surgery.The pain is pinpoint, at the side of last left rib, exacerbated by manual compression. The pain is unchanged since CT surgery years ago. \par Reports unlimited exercise tolerance, denies any chest pain when walking. Works everyday without any symptoms. \par Compliant with his medications but reports taking Aspirin sporadically 2/2 epistaxis\par reports that his BP at home is not optimally controlled. Also with mild LE edema .no orthopnea, no CAUSEY\par \par \par PMH as above\par \par ALL\par NKDA\par \par MEDICATIONS \par amlodipine 5 mg daily\par Atorvastatin 80 mg daily\par ranolazine  100 mg twice a day\par aspirin 81 mg daily \par \par FH\par no family history of CAD\par \par SH\par no smoke, no etoh, no drugs\par \par EKG 11/18/2019\par SR, wnl\par \par \par ECHO 3/7/2019\par normal LVEF, mild LVH\par \par NUC STRESS TEST\par 3/15/2019\par large severe fixed defect inferolateral and inferoseptal defect c/w prior infarct and mild perinfarct ischemia\par LVEF 51%\par \par LABS 11/19/2019\par chol 109\par hdl 46\par trig 126\par LDL 38

## 2019-12-31 NOTE — ASSESSMENT
[FreeTextEntry1] : 74-year-old man with past medical history of coronary artery disease status CABG in 2016 here for follow up\par \par \par CAD s/p CABG and PCI\par -chronic atypical symptoms, pinpoint chest pain at the site of rib cage, exacerbated by manual compression which has been present since his surgery\par -rec trial of Tylenol\par -nuc stress test results with only mild ischemia\par -recc to start aspirin 81 mg daily (reports episodes of epistaxis) \par -cont with toprol X 25 mg daily \par -cont with amlodipine and ranolazine\par -cont with statin\par -will evaluate repeat cath if symptoms persist on current antianginal therapy\par -recc med compliance \par \par HTN\par -not optimally controlled \par -in the setting of mild LE swelling will not up titrate amlodipine but will start HCTZ 12.5 mg daily \par -cont with amlodipine 5 mg daily \par -start HCTZ 12.5 mg daily \par -cont with metoprolol succinate 25 mg daily \par -recc to monitor BP particularly after starting betablocker\par -CMP\par \par HLD\par -obtain fasting lipid panel\par -cont with statin\par \par -f/u in 1 month for BP check

## 2020-01-27 ENCOUNTER — APPOINTMENT (OUTPATIENT)
Dept: HEART AND VASCULAR | Facility: CLINIC | Age: 75
End: 2020-01-27
Payer: COMMERCIAL

## 2020-01-27 VITALS — HEART RATE: 55 BPM | DIASTOLIC BLOOD PRESSURE: 62 MMHG | SYSTOLIC BLOOD PRESSURE: 134 MMHG

## 2020-01-27 PROCEDURE — 99215 OFFICE O/P EST HI 40 MIN: CPT

## 2020-01-27 RX ORDER — HYDROCHLOROTHIAZIDE 25 MG/1
25 TABLET ORAL DAILY
Qty: 30 | Refills: 3 | Status: ACTIVE | COMMUNITY
Start: 2019-12-30 | End: 1900-01-01

## 2020-01-27 NOTE — PHYSICAL EXAM
[General Appearance - Well Developed] : well developed [Normal Appearance] : normal appearance [Well Groomed] : well groomed [General Appearance - Well Nourished] : well nourished [No Deformities] : no deformities [General Appearance - In No Acute Distress] : no acute distress [Normal Oral Mucosa] : normal oral mucosa [No Oral Pallor] : no oral pallor [No Oral Cyanosis] : no oral cyanosis [Normal Jugular Venous A Waves Present] : normal jugular venous A waves present [Normal Jugular Venous V Waves Present] : normal jugular venous V waves present [No Jugular Venous Alcaraz A Waves] : no jugular venous alcaraz A waves [Heart Rate And Rhythm] : heart rate and rhythm were normal [Heart Sounds] : normal S1 and S2 [Murmurs] : no murmurs present [Respiration, Rhythm And Depth] : normal respiratory rhythm and effort [Exaggerated Use Of Accessory Muscles For Inspiration] : no accessory muscle use [Auscultation Breath Sounds / Voice Sounds] : lungs were clear to auscultation bilaterally [Abdomen Soft] : soft [Abdomen Tenderness] : non-tender [Abdomen Mass (___ Cm)] : no abdominal mass palpated [Nail Clubbing] : no clubbing of the fingernails [Cyanosis, Localized] : no localized cyanosis [Petechial Hemorrhages (___cm)] : no petechial hemorrhages [] : no ischemic changes [Oriented To Time, Place, And Person] : oriented to person, place, and time [Affect] : the affect was normal [Mood] : the mood was normal [No Anxiety] : not feeling anxious [FreeTextEntry1] : 1+ pitting edema b/l

## 2020-01-27 NOTE — ASSESSMENT
[FreeTextEntry1] : 74-year-old man with past medical history of coronary artery disease status CABG in 2016 here for follow up\par \par \par CAD s/p CABG and PCI\par -chronic atypical symptoms, pinpoint chest pain at the site of rib cage, exacerbated by manual compression and by carrying heavy objects  which has been present since his surgery\par -highly suspect musculoskeletal component vs neurological post sgy\par -rec trial of Tylenol\par -nuc stress test results with only mild ischemia\par -recc to start aspirin 81 mg daily (reports episodes of epistaxis) \par -cont with toprol X 25 mg daily \par -cont with amlodipine and ranolazine\par -cont with statin\par -will evaluate repeat cath if symptoms persist on current antianginal therapy\par -recc med compliance \par \par HTN\par -not optimally controlled \par -in the setting of mild LE swelling will uptitrate HCTZ to 25 mg daily \par -cont with amlodipine 5 mg daily (will not uptitrate to avoid worsening of LE edema) \par -CMP\par -cont with metoprolol succinate 25 mg daily \par -recc to monitor BP at home\par -CMP\par \par HLD\par -obtain fasting lipid panel\par -cont with statin\par \par -f/u in 2 weeks for BP check, lipid and CMP check and to evaluate anginal symptoms

## 2020-01-27 NOTE — HISTORY OF PRESENT ILLNESS
[FreeTextEntry1] : 74-year-old man with past medical history of coronary artery disease status CABG in 2016 and s/p PCI with YAO SVG to OM1 and prox and mid LAD PTCA on 01/27/2017, history of ischemic cardiomyopathy, NSVT, PSVT here for follow up. \par \par The patient reports the chronic left sided pinpoint pain which is unchanged since his surgery.The pain is pinpoint, at the side of last left rib, exacerbated by manual compression. The pain is unchanged since CT surgery years ago. His pinpoint pain is exacerbated when walking up to his forth floor apartment carrying heavy weights/bags. Denies chest pain when walking upstairs without carrying weights\par Reports unlimited exercise tolerance. Works everyday without any symptoms.\par Compliant with his medications but reports taking Aspirin sporadically 2/2 epistaxis\par reports that his BP at home is not optimally controlled. Also with mild LE edema .no orthopnea, no CAUSEY\par \par \par PMH \par as above\par \par ALL\par NKDA\par \par MEDICATIONS \par amlodipine 5 mg daily\par Atorvastatin 80 mg daily\par ranolazine  1000 mg twice a day\par aspirin 81 mg daily \par metopoprol 25 mg daily \par HCTZ 12.5 mg ---> increased to 25 mg daily on 1/27/2020\par \par FH\par no family history of CAD\par \par SH\par no smoke, no etoh, no drugs\par \par EKG 11/18/2019\par SR, wnl\par \par EKG 1/27/2020\par SR, possible q wave V1) \par \par ECHO 3/7/2019\par normal LVEF, mild LVH\par \par NUC STRESS TEST\par 3/15/2019\par large severe fixed defect inferolateral and inferoseptal defect c/w prior infarct and mild perinfarct ischemia\par LVEF 51%\par \par LABS 11/19/2019\par chol 109\par hdl 46\par trig 126\par LDL 38\par \par

## 2020-02-10 ENCOUNTER — APPOINTMENT (OUTPATIENT)
Dept: HEART AND VASCULAR | Facility: CLINIC | Age: 75
End: 2020-02-10

## 2025-01-30 NOTE — PATIENT PROFILE ADULT. - VISION (WITH CORRECTIVE LENSES IF THE PATIENT USUALLY WEARS THEM):
Normal vision: sees adequately in most situations; can see medication labels, newsprint [phone]